# Patient Record
Sex: FEMALE | Race: WHITE
[De-identification: names, ages, dates, MRNs, and addresses within clinical notes are randomized per-mention and may not be internally consistent; named-entity substitution may affect disease eponyms.]

---

## 2020-11-10 ENCOUNTER — HOSPITAL ENCOUNTER (EMERGENCY)
Dept: HOSPITAL 62 - ER | Age: 30
Discharge: LEFT BEFORE DISCHARGE | End: 2020-11-10
Payer: SELF-PAY

## 2020-11-10 VITALS — DIASTOLIC BLOOD PRESSURE: 86 MMHG | SYSTOLIC BLOOD PRESSURE: 137 MMHG

## 2020-11-10 DIAGNOSIS — Z53.21: Primary | ICD-10-CM

## 2020-11-12 NOTE — ER DOCUMENT REPORT
ED Medical Screen (RME)





- General


Chief Complaint: Eye Injury


Stated Complaint: EYE INJURY


Time Seen by Provider: 11/10/20 20:35


Mode of Arrival: Ambulatory


Information source: Patient


Notes: 





HPI; 30-year-old female presents to the emergency room complaining of left eye 

pain.  States her daughter accidentally poked her in the eye yesterday.  

Complains of severe pain and tearing.  No changes in vision.  No use of contacts

but does wear glasses.  No meds for treatment prior to arrival.





PE: Alert and oriented x3.  Unable to do full eye exam in triage.  Lungs: Clear 

to auscultation without rales, rhonchi, wheezes.  Heart: Regular rate rhythm 

without murmurs, rubs, gallops.








I have greeted and performed a rapid initial assessment of this patient.  A 

comprehensive ED assessment and evaluation of the patient, analysis of test 

results and completion of the medical decision making process will be conducted 

by additional ED providers.  I have specifically instructed the patient or 

family members with the patient to immediately return to any nursing staff 

should anything change in the patient's condition or with their chief complaint.


TRAVEL OUTSIDE OF THE [place] IN LAST 30 DAYS: No





- Related Data


Allergies/Adverse Reactions: 


                                        





No Known Allergies Allergy (Unverified 11/10/20 20:35)


   











Past Medical History





- Social History


Frequency of alcohol use: Occasional


Drug Abuse: Marijuana





Physical Exam





- Vital signs


Vitals: 


                                        











Temp Pulse Resp BP Pulse Ox


 


 98.4 F   88   18   137/86 H  99 


 


 11/10/20 19:10  11/10/20 19:10  11/10/20 19:10  11/10/20 19:10  11/10/20 19:10














Course





- Vital Signs


Vital signs: 


                                        











Temp Pulse Resp BP Pulse Ox


 


 98.4 F   88   18   137/86 H  99 


 


 11/10/20 19:10  11/10/20 19:10  11/10/20 19:10  11/10/20 19:10  11/10/20 19:10














Doctor's Discharge





- Discharge


Disposition: ELOPED